# Patient Record
Sex: FEMALE | URBAN - METROPOLITAN AREA
[De-identification: names, ages, dates, MRNs, and addresses within clinical notes are randomized per-mention and may not be internally consistent; named-entity substitution may affect disease eponyms.]

---

## 2025-03-25 LAB
NON-UH HIE ALB: 4 G/DL (ref 3.4–5)
NON-UH HIE ALK PHOS: 113 UNIT/L (ref 45–117)
NON-UH HIE BILIRUBIN, DIRECT: 0.15 MG/DL (ref 0–0.4)
NON-UH HIE BILIRUBIN, TOTAL: 0.7 MG/DL (ref 0.3–1.2)
NON-UH HIE GAMMA-GT: 15 UNIT/L (ref 0–38)
NON-UH HIE GOT: 26 UNIT/L (ref 15–37)
NON-UH HIE GPT: 30 UNIT/L (ref 10–49)
NON-UH HIE HEPATITIS A ANTIBODY, IGM: NORMAL
NON-UH HIE HEPATITIS B CORE ANTIBODY, IGM: NORMAL
NON-UH HIE HEPATITIS B SURFACE ANTIGEN: NORMAL
NON-UH HIE HEPATITIS C ANTIBODY: NORMAL
NON-UH HIE TOTAL PROTEIN: 7.4 G/DL (ref 5.7–8.2)

## 2025-03-27 LAB
NON-UH HIE ALP ISO BONE: 51 UNIT/L (ref 0–55)
NON-UH HIE ALP ISO LIVER: 84 UNIT/L (ref 0–94)
NON-UH HIE ALP ISO OTHER: 0 UNIT/L
NON-UH HIE ALP ISO TOTAL: 135 UNIT/L (ref 40–120)

## 2025-04-29 ENCOUNTER — TELEPHONE (OUTPATIENT)
Dept: GASTROENTEROLOGY | Facility: HOSPITAL | Age: 60
End: 2025-04-29

## 2025-04-29 NOTE — TELEPHONE ENCOUNTER
"I spoke with patient's , Eliezer, to make him aware that Dr. Robles stated the appointment in July was appropriate. The  also wanted to make sure it was okay for his wife to continue her Lipitor. The patient had not taken her Lipitor per husbands conversation for two days because, \"she felt it was possibly causing her problems with her liver.\" I made the  aware that per conversation between Dr. Robles and his nurse - Cyn Contreras RN, that his wife should continue taking her Lipitor.  "

## 2025-07-11 ENCOUNTER — TELEPHONE (OUTPATIENT)
Dept: GASTROENTEROLOGY | Facility: HOSPITAL | Age: 60
End: 2025-07-11

## 2025-07-14 PROBLEM — R17 ELEVATED BILIRUBIN: Status: ACTIVE | Noted: 2025-07-14

## 2025-07-14 PROBLEM — M70.31 BURSITIS OF RIGHT ELBOW: Status: ACTIVE | Noted: 2025-07-14

## 2025-07-14 PROBLEM — L30.4 INTERTRIGO: Status: ACTIVE | Noted: 2025-07-14

## 2025-07-14 PROBLEM — E78.1 HYPERTRIGLYCERIDEMIA: Status: ACTIVE | Noted: 2025-07-14

## 2025-07-14 PROBLEM — R03.0 SITUATIONAL HYPERTENSION: Status: ACTIVE | Noted: 2025-07-14

## 2025-07-14 PROBLEM — R60.0 EDEMA OF LOWER EXTREMITY: Status: ACTIVE | Noted: 2025-07-14

## 2025-07-14 PROBLEM — M25.521 RIGHT ELBOW PAIN: Status: ACTIVE | Noted: 2025-07-14

## 2025-07-14 PROBLEM — D12.6 BENIGN NEOPLASM OF COLON: Status: ACTIVE | Noted: 2025-04-22

## 2025-07-14 PROBLEM — R73.03 PREDIABETES: Status: ACTIVE | Noted: 2025-07-14

## 2025-07-14 PROBLEM — R74.8 ELEVATED ALKALINE PHOSPHATASE LEVEL: Status: ACTIVE | Noted: 2025-04-22

## 2025-07-14 PROBLEM — I83.90 VARICOSE VEINS OF LOWER EXTREMITY: Status: ACTIVE | Noted: 2025-07-14

## 2025-07-14 PROBLEM — R87.89 HUMAN PAPILLOMAVIRUS (HPV) DNA DETECTED IN CERVICAL SPECIMEN: Status: ACTIVE | Noted: 2025-07-14

## 2025-07-14 PROBLEM — N39.3 STRESS INCONTINENCE: Status: ACTIVE | Noted: 2025-07-14

## 2025-07-14 RX ORDER — ATORVASTATIN CALCIUM 20 MG/1
20 TABLET, FILM COATED ORAL
COMMUNITY

## 2025-07-14 RX ORDER — MELOXICAM 7.5 MG/1
7.5 TABLET ORAL DAILY PRN
COMMUNITY

## 2025-07-14 NOTE — PATIENT INSTRUCTIONS
If you have any questions or need assistance, please don't hesitate to contact us.        Our OFFICE is located at [facility]                    Please CALL the numbers below:      Dr. Robles:          Office [phone]         Fax: [phone]  Hepatology Nurse Coordinator:         Cyn MESSER [phone]          SCHEDULING   Main Scheduling Number: [phone] (See below for department name when scheduling)  You will get a notification through [employer] or a phone call/text to help you schedule all your tests. If you prefer, feel free to call the main scheduling number to set up any tests you need.

## 2025-07-15 ENCOUNTER — OFFICE VISIT (OUTPATIENT)
Dept: GASTROENTEROLOGY | Facility: CLINIC | Age: 60
End: 2025-07-15
Payer: COMMERCIAL

## 2025-07-15 VITALS
TEMPERATURE: 97.6 F | SYSTOLIC BLOOD PRESSURE: 143 MMHG | DIASTOLIC BLOOD PRESSURE: 87 MMHG | HEART RATE: 88 BPM | WEIGHT: 165 LBS

## 2025-07-15 DIAGNOSIS — R79.89 ABNORMAL LIVER FUNCTION TESTS: ICD-10-CM

## 2025-07-15 DIAGNOSIS — R74.8 ELEVATED ALKALINE PHOSPHATASE LEVEL: ICD-10-CM

## 2025-07-15 PROCEDURE — 99203 OFFICE O/P NEW LOW 30 MIN: CPT | Performed by: INTERNAL MEDICINE

## 2025-07-15 PROCEDURE — 99204 OFFICE O/P NEW MOD 45 MIN: CPT | Performed by: INTERNAL MEDICINE

## 2025-07-15 PROCEDURE — 3077F SYST BP >= 140 MM HG: CPT | Performed by: INTERNAL MEDICINE

## 2025-07-15 PROCEDURE — 3079F DIAST BP 80-89 MM HG: CPT | Performed by: INTERNAL MEDICINE

## 2025-07-15 RX ORDER — ACETAMINOPHEN 500 MG
25 TABLET ORAL DAILY
COMMUNITY

## 2025-07-15 RX ORDER — PSYLLIUM HUSK 0.4 G
1 CAPSULE ORAL DAILY
COMMUNITY

## 2025-07-15 ASSESSMENT — PAIN SCALES - GENERAL: PAINLEVEL_OUTOF10: 0-NO PAIN

## 2025-07-16 LAB
ALBUMIN SERPL-MCNC: 4.4 G/DL (ref 3.6–5.1)
ALBUMIN/GLOB SERPL: 1.5 (CALC) (ref 1–2.5)
ALP SERPL-CCNC: 96 U/L (ref 37–153)
ALT SERPL-CCNC: 20 U/L (ref 6–29)
AST SERPL-CCNC: 17 U/L (ref 10–35)
BILIRUB DIRECT SERPL-MCNC: 0.1 MG/DL
BILIRUB INDIRECT SERPL-MCNC: 0.4 MG/DL (CALC) (ref 0.2–1.2)
BILIRUB SERPL-MCNC: 0.5 MG/DL (ref 0.2–1.2)
GLOBULIN SER CALC-MCNC: 2.9 G/DL (CALC) (ref 1.9–3.7)
PROT SERPL-MCNC: 7.3 G/DL (ref 6.1–8.1)

## 2025-07-18 ENCOUNTER — TELEPHONE (OUTPATIENT)
Dept: GASTROENTEROLOGY | Facility: CLINIC | Age: 60
End: 2025-07-18
Payer: COMMERCIAL

## 2025-07-18 DIAGNOSIS — R79.89 ABNORMAL LIVER FUNCTION TESTS: ICD-10-CM

## 2025-07-18 DIAGNOSIS — K76.0 HEPATIC STEATOSIS: ICD-10-CM

## 2025-07-18 ASSESSMENT — ENCOUNTER SYMPTOMS
CONSTIPATION: 0
JOINT SWELLING: 0
NAUSEA: 0
FEVER: 0
BLOOD IN STOOL: 0
DIFFICULTY URINATING: 0
DIZZINESS: 0
ARTHRALGIAS: 0
DIARRHEA: 0
CHILLS: 0
WHEEZING: 0
LIGHT-HEADEDNESS: 0
COUGH: 0
SPEECH DIFFICULTY: 0
ABDOMINAL DISTENTION: 0
COLOR CHANGE: 0
TROUBLE SWALLOWING: 0
VOMITING: 0
UNEXPECTED WEIGHT CHANGE: 0
CONFUSION: 0
HEADACHES: 0
SHORTNESS OF BREATH: 0
ABDOMINAL PAIN: 0
SLEEP DISTURBANCE: 0

## 2025-07-18 NOTE — PROGRESS NOTES
Subjective  Mild elevation in alkphos and imaging suggesting steatosis prompted an eval in her referring gastroenterologist's office which revealed no steatosis and F2 fibrosis. IQR/median was elevated at 26%. She feels well and denies fluid overload or cognitive impairment. The only identifiable cardiometabolic risk is hyperlipidemia. She rarely consumes alcohol. History taking is primarily provided by spouse.        Review of Systems   Constitutional:  Negative for chills, fever and unexpected weight change.   HENT:  Negative for congestion and trouble swallowing.    Respiratory:  Negative for cough, shortness of breath and wheezing.    Cardiovascular:  Negative for chest pain.   Gastrointestinal:  Negative for abdominal distention, abdominal pain, blood in stool, constipation, diarrhea, nausea and vomiting.   Genitourinary:  Negative for difficulty urinating.   Musculoskeletal:  Negative for arthralgias and joint swelling.   Skin:  Negative for color change.   Neurological:  Negative for dizziness, speech difficulty, light-headedness and headaches.   Psychiatric/Behavioral:  Negative for confusion and sleep disturbance.        Physical Exam  Constitutional:       General: She is awake.      Appearance: Normal appearance.   HENT:      Head: Normocephalic and atraumatic.      Nose: Nose normal.      Mouth/Throat:      Mouth: Mucous membranes are moist.     Eyes:      Pupils: Pupils are equal, round, and reactive to light.     Neck:      Thyroid: No thyroid mass.      Trachea: Phonation normal.     Cardiovascular:      Rate and Rhythm: Normal rate and regular rhythm.   Pulmonary:      Effort: Pulmonary effort is normal. No respiratory distress.      Breath sounds: Normal air entry. No decreased breath sounds, wheezing, rhonchi or rales.   Abdominal:      General: Bowel sounds are normal. There is no distension.      Palpations: Abdomen is soft.      Tenderness: There is no abdominal tenderness.     Musculoskeletal:  "     Cervical back: Neck supple.      Right lower leg: No edema.      Left lower leg: No edema.     Skin:     General: Skin is warm.      Capillary Refill: Capillary refill takes less than 2 seconds.     Neurological:      General: No focal deficit present.      Mental Status: She is alert and oriented to person, place, and time. Mental status is at baseline.      Cranial Nerves: Cranial nerves 2-12 are intact.      Motor: Motor function is intact.     Psychiatric:         Attention and Perception: Attention and perception normal.         Mood and Affect: Mood normal.         Speech: Speech normal.         Behavior: Behavior normal.     LABS:   Lab Results   Component Value Date    ALBUMIN 4.4 07/15/2025    BILITOT 0.5 07/15/2025    BILIDIR 0.1 07/15/2025    ALKPHOS 96 07/15/2025    ALT 20 07/15/2025    AST 17 07/15/2025    PROT 7.3 07/15/2025      No results found for: \"JHCC874\", \"ANPM270\"   Lab Results   Component Value Date    BILITOT 0.5 07/15/2025     No results found for: \"AFP\"   Lab Results   Component Value Date    ALBUMIN 4.4 07/15/2025    BILITOT 0.5 07/15/2025    BILIDIR 0.1 07/15/2025    ALKPHOS 96 07/15/2025    ALT 20 07/15/2025    AST 17 07/15/2025    PROT 7.3 07/15/2025      No results found for: \"WBC\", \"HGB\", \"HCT\", \"MCV\", \"PLT\"  No results found for: \"GLUCOSE\", \"CALCIUM\", \"NA\", \"K\", \"CO2\", \"CL\", \"BUN\", \"CREATININE\"  No results found for: \"INR\"     Elevated alkaline phosphatase level  The index of suspicion for MASLD is low given absence of clinical context.   We encouraged diet and exercise  We will complete a work up for causes of chronic liver disease   we will order repeat FIBROSCAN and ELF to assess for steatosis and fibrosis  we will see this patient in 6 weeks     "

## 2025-07-18 NOTE — ASSESSMENT & PLAN NOTE
The index of suspicion for MASLD is low given absence of clinical context.   We encouraged diet and exercise  We will complete a work up for causes of chronic liver disease   we will order repeat FIBROSCAN and ELF to assess for steatosis and fibrosis  we will see this patient in 6 weeks